# Patient Record
Sex: MALE | Race: WHITE | NOT HISPANIC OR LATINO | URBAN - METROPOLITAN AREA
[De-identification: names, ages, dates, MRNs, and addresses within clinical notes are randomized per-mention and may not be internally consistent; named-entity substitution may affect disease eponyms.]

---

## 2023-10-25 ENCOUNTER — OFFICE VISIT (OUTPATIENT)
Dept: URGENT CARE | Facility: CLINIC | Age: 43
End: 2023-10-25
Payer: COMMERCIAL

## 2023-10-25 VITALS
RESPIRATION RATE: 18 BRPM | OXYGEN SATURATION: 99 % | WEIGHT: 280 LBS | HEART RATE: 88 BPM | DIASTOLIC BLOOD PRESSURE: 96 MMHG | HEIGHT: 77 IN | SYSTOLIC BLOOD PRESSURE: 168 MMHG | TEMPERATURE: 97.4 F | BODY MASS INDEX: 33.06 KG/M2

## 2023-10-25 DIAGNOSIS — H57.89 IRRITATION OF RIGHT EYE: Primary | ICD-10-CM

## 2023-10-25 PROCEDURE — 99213 OFFICE O/P EST LOW 20 MIN: CPT | Performed by: PHYSICIAN ASSISTANT

## 2023-10-25 RX ORDER — TOBRAMYCIN 3 MG/ML
2 SOLUTION/ DROPS OPHTHALMIC
Qty: 5 ML | Refills: 0 | Status: SHIPPED | OUTPATIENT
Start: 2023-10-25 | End: 2023-11-01

## 2023-10-25 NOTE — PROGRESS NOTES
[facility]        NAME: Cassandra Munoz is a 38 y.o. male  : 1980    MRN: [id number]  DATE: 2023  TIME: 9:26 AM    Assessment and Plan   Irritation of right eye [H57.89]  1. Irritation of right eye  tobramycin (TOBREX) 0.3 % SOLN            Patient Instructions   Antibiotic eyedrops. Warm compress. Avoid rubbing eyes. Antihistamine such as Claritin or Zyrtec. Follow up with PCP in 3-5 days. Proceed to  ER if symptoms worsen. Chief Complaint     Chief Complaint   Patient presents with    Eye Problem     Right eye redness x 4 days. Patient states he feels something on his eye         History of Present Illness       Patient is a 44-year-old male with no significant past medical history presents the office planing of right eye redness for 4 days. Reports irritation and foreign body sensation. States he also wakes up with his eye pasted shut and continues to have thick discharge throughout the day. Denies vision changes, photophobia, painful EOMs, or injury. Denies URI symptoms. He does not wear contacts or glasses. He tried to flushing out his eye multiple times without relief. Review of Systems   Review of Systems   Eyes:  Positive for pain, discharge and redness. Negative for photophobia, itching and visual disturbance. Current Medications       Current Outpatient Medications:     tobramycin (TOBREX) 0.3 % SOLN, Administer 2 drops to the right eye every 4 (four) hours while awake for 7 days, Disp: 5 mL, Rfl: 0    Current Allergies     Allergies as of 10/25/2023    (No Known Allergies)            The following portions of the patient's history were reviewed and updated as appropriate: allergies, current medications, past family history, past medical history, past social history, past surgical history and problem list.     Past Medical History:   Diagnosis Date    MI (myocardial infarction) ([address])        History reviewed.  No pertinent surgical history. History reviewed. No pertinent family history. Medications have been verified. Objective   /96   Pulse 88   Temp (!) 97.4 °F (36.3 °C) (Temporal)   Resp 18   Ht 6' 5" (1.956 m)   Wt 127 kg (280 lb)   SpO2 99%   BMI 33.20 kg/m²   No LMP for male patient. Physical Exam     Physical Exam  Vitals and nursing note reviewed. Constitutional:       Appearance: He is well-developed. HENT:      Head: Normocephalic and atraumatic. Right Ear: External ear normal.      Left Ear: External ear normal.      Nose: Nose normal.   Eyes:      General: Lids are normal. Lids are everted, no foreign bodies appreciated. Vision grossly intact. Right eye: Discharge (trace) present. No foreign body or hordeolum. Extraocular Movements: Extraocular movements intact. Right eye: No nystagmus. Conjunctiva/sclera:      Right eye: Right conjunctiva is injected. Pupils: Pupils are equal, round, and reactive to light. Right eye: No corneal abrasion or fluorescein uptake. Skin:     General: Skin is warm and dry. Capillary Refill: Capillary refill takes less than 2 seconds. Neurological:      Mental Status: He is alert.